# Patient Record
Sex: FEMALE | Race: WHITE | NOT HISPANIC OR LATINO | Employment: STUDENT | URBAN - METROPOLITAN AREA
[De-identification: names, ages, dates, MRNs, and addresses within clinical notes are randomized per-mention and may not be internally consistent; named-entity substitution may affect disease eponyms.]

---

## 2021-06-21 ENCOUNTER — TELEPHONE (OUTPATIENT)
Dept: OTOLARYNGOLOGY | Facility: CLINIC | Age: 21
End: 2021-06-21

## 2021-07-08 ENCOUNTER — OFFICE VISIT (OUTPATIENT)
Dept: OTOLARYNGOLOGY | Facility: CLINIC | Age: 21
End: 2021-07-08
Payer: COMMERCIAL

## 2021-07-08 VITALS
WEIGHT: 121.69 LBS | BODY MASS INDEX: 20.78 KG/M2 | DIASTOLIC BLOOD PRESSURE: 87 MMHG | HEIGHT: 64 IN | SYSTOLIC BLOOD PRESSURE: 126 MMHG

## 2021-07-08 DIAGNOSIS — R07.0 THROAT DISCOMFORT: Primary | ICD-10-CM

## 2021-07-08 DIAGNOSIS — J30.9 ALLERGIC RHINITIS, UNSPECIFIED SEASONALITY, UNSPECIFIED TRIGGER: ICD-10-CM

## 2021-07-08 DIAGNOSIS — Z79.01 ANTICOAGULANT LONG-TERM USE: ICD-10-CM

## 2021-07-08 DIAGNOSIS — R04.0 RIGHT-SIDED EPISTAXIS: ICD-10-CM

## 2021-07-08 PROCEDURE — 1126F PR PAIN SEVERITY QUANTIFIED, NO PAIN PRESENT: ICD-10-PCS | Mod: S$GLB,,, | Performed by: NURSE PRACTITIONER

## 2021-07-08 PROCEDURE — 99203 PR OFFICE/OUTPT VISIT, NEW, LEVL III, 30-44 MIN: ICD-10-PCS | Mod: 25,S$GLB,, | Performed by: NURSE PRACTITIONER

## 2021-07-08 PROCEDURE — 3008F BODY MASS INDEX DOCD: CPT | Mod: CPTII,S$GLB,, | Performed by: NURSE PRACTITIONER

## 2021-07-08 PROCEDURE — 30901 CONTROL OF NOSEBLEED: CPT | Mod: RT,S$GLB,, | Performed by: NURSE PRACTITIONER

## 2021-07-08 PROCEDURE — 1126F AMNT PAIN NOTED NONE PRSNT: CPT | Mod: S$GLB,,, | Performed by: NURSE PRACTITIONER

## 2021-07-08 PROCEDURE — 3008F PR BODY MASS INDEX (BMI) DOCUMENTED: ICD-10-PCS | Mod: CPTII,S$GLB,, | Performed by: NURSE PRACTITIONER

## 2021-07-08 PROCEDURE — 99203 OFFICE O/P NEW LOW 30 MIN: CPT | Mod: 25,S$GLB,, | Performed by: NURSE PRACTITIONER

## 2021-07-08 PROCEDURE — 30901 PR CTRL 2SEBLEED,ANTER,SIMPLE: ICD-10-PCS | Mod: RT,S$GLB,, | Performed by: NURSE PRACTITIONER

## 2021-07-08 PROCEDURE — 99999 PR PBB SHADOW E&M-EST. PATIENT-LVL III: CPT | Mod: PBBFAC,,, | Performed by: NURSE PRACTITIONER

## 2021-07-08 PROCEDURE — 99999 PR PBB SHADOW E&M-EST. PATIENT-LVL III: ICD-10-PCS | Mod: PBBFAC,,, | Performed by: NURSE PRACTITIONER

## 2021-07-08 RX ORDER — SUMATRIPTAN 50 MG/1
TABLET, FILM COATED ORAL
COMMUNITY
Start: 2021-06-18

## 2024-05-19 ENCOUNTER — HOSPITAL ENCOUNTER (EMERGENCY)
Facility: HOSPITAL | Age: 24
Discharge: HOME | End: 2024-05-19
Attending: EMERGENCY MEDICINE
Payer: COMMERCIAL

## 2024-05-19 VITALS
TEMPERATURE: 97 F | HEIGHT: 64 IN | RESPIRATION RATE: 21 BRPM | BODY MASS INDEX: 21.34 KG/M2 | OXYGEN SATURATION: 100 % | DIASTOLIC BLOOD PRESSURE: 90 MMHG | WEIGHT: 125 LBS | HEART RATE: 69 BPM | SYSTOLIC BLOOD PRESSURE: 119 MMHG

## 2024-05-19 DIAGNOSIS — G08 CEREBRAL VENOUS THROMBOSIS OF SIGMOID SINUS: ICD-10-CM

## 2024-05-19 DIAGNOSIS — B09 VIRAL EXANTHEM: ICD-10-CM

## 2024-05-19 DIAGNOSIS — T78.40XA ALLERGIC REACTION, INITIAL ENCOUNTER: Primary | ICD-10-CM

## 2024-05-19 LAB
ALBUMIN SERPL BCP-MCNC: 4.6 G/DL (ref 3.5–5.2)
ALP SERPL-CCNC: 46 U/L (ref 55–135)
ALT SERPL W/O P-5'-P-CCNC: 13 U/L (ref 10–44)
ANION GAP SERPL CALC-SCNC: 8 MMOL/L (ref 8–16)
AST SERPL-CCNC: 19 U/L (ref 10–40)
B-HCG UR QL: NEGATIVE
BASOPHILS # BLD AUTO: 0.13 K/UL (ref 0–0.2)
BASOPHILS NFR BLD: 2.7 % (ref 0–1.9)
BILIRUB SERPL-MCNC: 0.4 MG/DL (ref 0.1–1)
BUN SERPL-MCNC: 10 MG/DL (ref 6–20)
CALCIUM SERPL-MCNC: 9.3 MG/DL (ref 8.7–10.5)
CHLORIDE SERPL-SCNC: 106 MMOL/L (ref 95–110)
CO2 SERPL-SCNC: 22 MMOL/L (ref 23–29)
CREAT SERPL-MCNC: 0.7 MG/DL (ref 0.5–1.4)
CREAT SERPL-MCNC: 0.7 MG/DL (ref 0.5–1.4)
CTP QC/QA: YES
DIFFERENTIAL METHOD BLD: ABNORMAL
EOSINOPHIL # BLD AUTO: 0.2 K/UL (ref 0–0.5)
EOSINOPHIL NFR BLD: 4.8 % (ref 0–8)
ERYTHROCYTE [DISTWIDTH] IN BLOOD BY AUTOMATED COUNT: 18.6 % (ref 11.5–14.5)
EST. GFR  (NO RACE VARIABLE): >60 ML/MIN/1.73 M^2
GLUCOSE SERPL-MCNC: 82 MG/DL (ref 70–110)
GLUCOSE SERPL-MCNC: 82 MG/DL (ref 70–110)
HCT VFR BLD AUTO: 32.8 % (ref 37–48.5)
HGB BLD-MCNC: 10.1 G/DL (ref 12–16)
IMM GRANULOCYTES # BLD AUTO: 0 K/UL (ref 0–0.04)
IMM GRANULOCYTES NFR BLD AUTO: 0 % (ref 0–0.5)
INR PPP: 0.9 (ref 0.8–1.2)
LYMPHOCYTES # BLD AUTO: 1.9 K/UL (ref 1–4.8)
LYMPHOCYTES NFR BLD: 39.3 % (ref 18–48)
MCH RBC QN AUTO: 23.2 PG (ref 27–31)
MCHC RBC AUTO-ENTMCNC: 30.8 G/DL (ref 32–36)
MCV RBC AUTO: 75 FL (ref 82–98)
MONOCYTES # BLD AUTO: 0.5 K/UL (ref 0.3–1)
MONOCYTES NFR BLD: 11.2 % (ref 4–15)
NEUTROPHILS # BLD AUTO: 2 K/UL (ref 1.8–7.7)
NEUTROPHILS NFR BLD: 42 % (ref 38–73)
NRBC BLD-RTO: 0 /100 WBC
OHS QRS DURATION: 94 MS
OHS QTC CALCULATION: 434 MS
PLATELET # BLD AUTO: 648 K/UL (ref 150–450)
PMV BLD AUTO: 8.9 FL (ref 9.2–12.9)
POTASSIUM SERPL-SCNC: 3.9 MMOL/L (ref 3.5–5.1)
PROT SERPL-MCNC: 8 G/DL (ref 6–8.4)
PROTHROMBIN TIME: 10.4 SEC (ref 9–12.5)
RBC # BLD AUTO: 4.35 M/UL (ref 4–5.4)
SAMPLE: NORMAL
SODIUM SERPL-SCNC: 136 MMOL/L (ref 136–145)
TSH SERPL DL<=0.005 MIU/L-ACNC: 1.63 UIU/ML (ref 0.34–5.6)
WBC # BLD AUTO: 4.81 K/UL (ref 3.9–12.7)

## 2024-05-19 PROCEDURE — 99285 EMERGENCY DEPT VISIT HI MDM: CPT | Mod: 25

## 2024-05-19 PROCEDURE — 85610 PROTHROMBIN TIME: CPT | Performed by: EMERGENCY MEDICINE

## 2024-05-19 PROCEDURE — 93010 ELECTROCARDIOGRAM REPORT: CPT | Mod: ,,, | Performed by: INTERNAL MEDICINE

## 2024-05-19 PROCEDURE — 80053 COMPREHEN METABOLIC PANEL: CPT | Performed by: EMERGENCY MEDICINE

## 2024-05-19 PROCEDURE — 85025 COMPLETE CBC W/AUTO DIFF WBC: CPT | Performed by: EMERGENCY MEDICINE

## 2024-05-19 PROCEDURE — 81025 URINE PREGNANCY TEST: CPT | Performed by: EMERGENCY MEDICINE

## 2024-05-19 PROCEDURE — 82565 ASSAY OF CREATININE: CPT

## 2024-05-19 PROCEDURE — 84443 ASSAY THYROID STIM HORMONE: CPT | Performed by: EMERGENCY MEDICINE

## 2024-05-19 PROCEDURE — 93005 ELECTROCARDIOGRAM TRACING: CPT | Performed by: INTERNAL MEDICINE

## 2024-05-19 RX ORDER — PREDNISONE 20 MG/1
20 TABLET ORAL DAILY
Qty: 5 TABLET | Refills: 0 | Status: SHIPPED | OUTPATIENT
Start: 2024-05-19 | End: 2024-05-21

## 2024-05-19 RX ORDER — VALACYCLOVIR HYDROCHLORIDE 1 G/1
1000 TABLET, FILM COATED ORAL EVERY 12 HOURS
Qty: 14 TABLET | Refills: 0 | Status: SHIPPED | OUTPATIENT
Start: 2024-05-19 | End: 2024-05-19

## 2024-05-19 RX ORDER — VALACYCLOVIR HYDROCHLORIDE 1 G/1
1000 TABLET, FILM COATED ORAL EVERY 12 HOURS
Qty: 14 TABLET | Refills: 0 | Status: SHIPPED | OUTPATIENT
Start: 2024-05-19 | End: 2024-05-21

## 2024-05-19 RX ORDER — PREDNISONE 20 MG/1
20 TABLET ORAL DAILY
Qty: 5 TABLET | Refills: 0 | Status: SHIPPED | OUTPATIENT
Start: 2024-05-19 | End: 2024-05-19

## 2024-05-19 NOTE — ED PROVIDER NOTES
Encounter Date: 5/19/2024       History     Chief Complaint   Patient presents with    Facial Swelling     C/o rt facial swelling. Onset this am. Denies trauma.     24-year-old well-appearing female presents emergency department with a history of chronic left sphenoid sinus thrombosis presents emergency department with complaint of right-sided facial swelling.  Patient states yesterday she had a headache she took Motrin prior to going to bed she states she woke up this morning and noticed an area of swelling to the right side of her frontal forehead, under her right eye, and half of her right lower lip.  Patient denies any current headache, numbness or tingling to her face or extremities, weakness.  She has no obvious focal neuro deficits.  Patient's mother is with her and reports that she was taken off of Coumadin prior to graduating from high school    The history is provided by the patient and a parent.     Review of patient's allergies indicates:  No Known Allergies  No past medical history on file.  No past surgical history on file.  No family history on file.  Social History     Tobacco Use    Smoking status: Never   Substance Use Topics    Alcohol use: No     Alcohol/week: 0.0 standard drinks of alcohol    Drug use: No     Review of Systems   Constitutional: Negative.    HENT:  Positive for facial swelling. Negative for hearing loss, rhinorrhea, sinus pain and trouble swallowing.    Respiratory: Negative.     Cardiovascular: Negative.    Gastrointestinal: Negative.    Genitourinary: Negative.    Skin: Negative.    Neurological:  Negative for dizziness, tremors, seizures, syncope, speech difficulty, weakness, light-headedness and headaches.   Hematological: Negative.    Psychiatric/Behavioral: Negative.     All other systems reviewed and are negative.      Physical Exam     Initial Vitals [05/19/24 0849]   BP Pulse Resp Temp SpO2   126/87 78 15 97.8 °F (36.6 °C) 99 %      MAP       --         Physical  Exam    Nursing note and vitals reviewed.  Constitutional: She appears well-developed and well-nourished.   HENT:   Head: Head is without right periorbital erythema.       Right Ear: External ear normal.   Left Ear: External ear normal.   Nose: Nose normal.   Mouth/Throat: Uvula is midline, oropharynx is clear and moist and mucous membranes are normal. No oropharyngeal exudate or tonsillar abscesses.   Eyes: Conjunctivae and EOM are normal. Pupils are equal, round, and reactive to light.   Neck: Neck supple.   Normal range of motion.  Cardiovascular:  Normal rate, regular rhythm, normal heart sounds and intact distal pulses.           Pulmonary/Chest: Breath sounds normal.   Abdominal: Abdomen is soft. Bowel sounds are normal.   Musculoskeletal:      Cervical back: Normal range of motion and neck supple.     Neurological: She is alert and oriented to person, place, and time. She has normal strength. GCS score is 15. GCS eye subscore is 4. GCS verbal subscore is 5. GCS motor subscore is 6.   Skin: Skin is warm.   Psychiatric: She has a normal mood and affect.         ED Course   Procedures  Labs Reviewed   CBC W/ AUTO DIFFERENTIAL - Abnormal; Notable for the following components:       Result Value    Hemoglobin 10.1 (*)     Hematocrit 32.8 (*)     MCV 75 (*)     MCH 23.2 (*)     MCHC 30.8 (*)     RDW 18.6 (*)     Platelets 648 (*)     MPV 8.9 (*)     Basophil % 2.7 (*)     All other components within normal limits   COMPREHENSIVE METABOLIC PANEL - Abnormal; Notable for the following components:    CO2 22 (*)     Alkaline Phosphatase 46 (*)     All other components within normal limits   PROTIME-INR   TSH   POCT URINE PREGNANCY   ISTAT CREATININE   POCT GLUCOSE   POCT GLUCOSE MONITORING CONTINUOUS        ECG Results              ECG 12 lead (Final result)        Collection Time Result Time QRS Duration OHS QTC Calculation    05/19/24 09:59:47 05/19/24 10:30:55 94 434                     Final result by Interface, Lab  In [facility] (05/19/24 10:30:59)                   Narrative:    Test Reason : R29.818,    Vent. Rate : 082 BPM     Atrial Rate : 082 BPM     P-R Int : 150 ms          QRS Dur : 094 ms      QT Int : 372 ms       P-R-T Axes : 070 081 075 degrees     QTc Int : 434 ms    Normal sinus rhythm  Normal ECG  No previous ECGs available  Confirmed by José Miguel Isabel MD ([id number]) on 5/19/2024 10:30:51 AM    Referred By: [email or web address]   SELF           Confirmed By:José Miguel Isabel MD                                  Imaging Results              MRV Brain Without Contrast (Final result)  Result time 05/19/24 12:01:48      Final result by Jimmy Howe MD (05/19/24 12:01:48)                   Impression:      Findings compatible with the history of a chronic left transverse and sigmoid sinus thrombus.      Electronically signed by: Jimmy Howe  Date:    05/19/2024  Time:    12:01               Narrative:    CLINICAL HISTORY:  ([id number])25 y/o  (2000) F    Dural venous sinus thrombosis suspected;    TECHNIQUE:  (A#[id number], exam time 5/19/2024 11:52)    MRV BRAIN WITHOUT CONTRAST [id number]    MR venography of the brain was performed with three dimensional maximal intensity projection reconstructed images were created on an independent workstation and reviewed, as well as source images.    COMPARISON:  Outside report from 06/20/2016    FINDINGS:  Review of source images and maximum intensity projections demonstrates the superior sagittal sinus, straight sinus, right transverse sinuses, right sigmoid sinuses to have a normal flow pattern.  The left transverse sinus is not opacified, with minimal flow in the sigmoid sinus (likely related to chronic thrombosis), with small collaterals supplying the left jugular vein, likely through tiny occipital and collateral branches.  The jugular bulbs, vein of Keith, and internal cerebral veins were identified and appeared otherwise unremarkable.                                       MRI  Brain Without Contrast (Final result)  Result time 05/19/24 11:58:39      Final result by Jimmy Howe MD (05/19/24 11:58:39)                   Impression:      No acute intracranial process. Normal appearing MRI of the Brain.      Electronically signed by: Jimmy Howe  Date:    05/19/2024  Time:    11:58               Narrative:    CLINICAL HISTORY:  ([id number])23 y/o  (2000) F    Headache, chronic, no new features;Acute focal neurological deficit;    TECHNIQUE:  ([id number], exam time 5/19/2024 11:52)    MRI BRAIN WITHOUT CONTRAST [id number]    Multiplanar multisequence MRI of the brain was performed.    CONTRAST:    No contrast was administered.    COMPARISON:  None available.    FINDINGS:  No hydrocephalus, herniation or midline shift and the basal/suprasellar cisterns are patent.  Diffusion imaging does not indicate acute or recent stroke. No bleed is seen on the gradient reversal images.    The brain has normal ventricles with symmetrical sulci, unremarkable internal structure, and normal signal pattern throughout. There is no cerebral or cerebellar atrophy.    The pituitary gland and infundibulum is normal in size without abnormal signal pattern. The craniocervical junction is unremarkable. The temporal bones, internal and external meati, and semicircular canals appear normal. The orbital contents are normal.  The visualized paranasal sinuses and mastoid air cells are clear.                                       Medications - No data to display  Medical Decision Making  24-year-old well-appearing female presents emergency department with a history of chronic left sphenoid sinus thrombosis presents emergency department with complaint of right-sided facial swelling.  Patient states yesterday she had a headache she took Motrin prior to going to bed she states she woke up this morning and noticed an area of swelling to the right side of her frontal forehead, under her right eye, and half of her  right lower lip.  Patient denies any current headache, numbness or tingling to her face or extremities, weakness.  She has no obvious focal neuro deficits.  Patient's mother is with her and reports that she was taken off of Coumadin prior to graduating from high school    The history is provided by the patient and a parent.     Considerations include but not limited to, chronic left sigmoid sinus thrombosis, CVA, electrolyte abnormalities, acute allergic reaction, HSV, zoster    24-year-old female presents emergency department has a history of chronic left sphenoid sinus thrombosis that was diagnosed when she was a teenager she was previously on Coumadin however she was taken off of Coumadin several years ago patient has been fine she states that last night she had a headache she woke up this morning and noted facial swelling to the frontal aspect of the forehead, under her right eye, and half of her right lower bottom lip was swollen with a vesicle.  Patient denies any recent traumas, itching, visual disturbances, fevers focal weakness.  Neuro exam is grossly intact with no upper or lower extremity weakness she is able to ambulate without drift, negative Romberg, no negative finger-nose.  Further evaluation performed with labs which were unremarkable patient's H&H is stable compared to previous labs noted in the system that were drawn at [facility] she also has elevated platelets again this is been stable for the patient and persistent since previous labs further evaluation performed with MRI of the brain which revealed no acute stroke or abnormalities.  MRV performed which revealedFindings compatible with the history of a chronic left transverse and sigmoid sinus thrombus.  On re-evaluation of the patient the swelling to the right frontal aspect has completely resolved and swelling underneath the right eye and lip is significantly better without any type of medications.After discussion with my attending MD we decided  to treat for possible viral and or allergic reaction.  Patient will be discharged home with oral steroids for the next 5 days, antiviral medications she was instructed to take oral any over-the-counter an appointment was made for establishment with a PCP who she was instructed to follow up with to get a referral to heme Onc.  Patient and her mother were given return precautions    Amount and/or Complexity of Data Reviewed  Independent Historian: parent  External Data Reviewed: labs, radiology and notes.     Details: Reviewed outside labs and MRI/ MRA   Labs: ordered. Decision-making details documented in ED Course.  Radiology: ordered.    Risk  Prescription drug management.                                      Clinical Impression:  Final diagnoses:  [T78.40XA] Allergic reaction, initial encounter (Primary)  [G08] Cerebral venous thrombosis of sigmoid sinus  [B09] Viral exanthem          ED Disposition Condition    Discharge Stable          ED Prescriptions       Medication Sig Dispense Start Date End Date Auth. Provider    predniSONE (DELTASONE) 20 MG tablet  (Status: Discontinued) Take 1 tablet (20 mg total) by mouth once daily. for 5 days 5 tablet 5/19/2024 5/19/2024 Reina Peres FNP    valACYclovir (VALTREX) 1000 MG tablet  (Status: Discontinued) Take 1 tablet (1,000 mg total) by mouth every 12 (twelve) hours. for 7 days 14 tablet 5/19/2024 5/19/2024 Reina Peres FNP    predniSONE (DELTASONE) 20 MG tablet Take 1 tablet (20 mg total) by mouth once daily. for 5 days 5 tablet 5/19/2024 5/24/2024 Jacob Munoz MD    valACYclovir (VALTREX) 1000 MG tablet Take 1 tablet (1,000 mg total) by mouth every 12 (twelve) hours. for 7 days 14 tablet 5/19/2024 5/26/2024 Jacob Munoz MD          Follow-up Information       Follow up With Specialties Details Why Contact Info    Jan Menezes MD Family Medicine  as scheduled [address]  [address]  [place] [address]  [phone]               Ja  Reina, [facility]  05/19/24 1641

## 2024-05-19 NOTE — DISCHARGE INSTRUCTIONS
Take over-the-counter antihistamines i.e. Benadryl or Zyrtec  Take oral medications as directed until all gone  Please follow-up with primary care provider for further evaluation, definitive care, and referral to heme Onc  Return if condition becomes worse for any concerns

## 2024-05-19 NOTE — Clinical Note
"Malia"Chyna Michelle was seen and treated in our emergency department on 5/19/2024.  She may return to work on 05/21/2024.       If you have any questions or concerns, please don't hesitate to call.      Reina Peres, RASHEED"

## 2024-05-19 NOTE — ED NOTES
"R FACIAL SWELLING EXTENDING UP TO FOREHEAD.  DENIES SOB OR EAR PAIN. PRIVATE ROOM. EVEN AND NON LABORED RESPIRATIONS.  AIRWAY CLEAR.  PULSES REGULAR.  < 3" CAPILLARY REFILL. SKIN WDI.  MAEW.  NON DISTENDED ABDOMEN. ALERT, ORIENTED AND AMBULATORY. NAD AT THIS TIME.  PARENT AT BS. CALL LIGHT IN REACH.  "

## 2024-05-21 ENCOUNTER — OFFICE VISIT (OUTPATIENT)
Dept: FAMILY MEDICINE | Facility: CLINIC | Age: 24
End: 2024-05-21
Payer: COMMERCIAL

## 2024-05-21 VITALS
SYSTOLIC BLOOD PRESSURE: 110 MMHG | DIASTOLIC BLOOD PRESSURE: 72 MMHG | OXYGEN SATURATION: 98 % | HEART RATE: 83 BPM | TEMPERATURE: 99 F | BODY MASS INDEX: 20.14 KG/M2 | RESPIRATION RATE: 18 BRPM | HEIGHT: 64 IN | WEIGHT: 118 LBS

## 2024-05-21 DIAGNOSIS — G08 CEREBRAL VENOUS THROMBOSIS: ICD-10-CM

## 2024-05-21 DIAGNOSIS — G56.03 BILATERAL CARPAL TUNNEL SYNDROME: ICD-10-CM

## 2024-05-21 DIAGNOSIS — Z23 IMMUNIZATION DUE: ICD-10-CM

## 2024-05-21 DIAGNOSIS — Z00.00 PREVENTATIVE HEALTH CARE: ICD-10-CM

## 2024-05-21 DIAGNOSIS — G44.1 VASCULAR HEADACHE: Primary | ICD-10-CM

## 2024-05-21 PROCEDURE — 1159F MED LIST DOCD IN RCRD: CPT | Mod: CPTII,S$GLB,, | Performed by: FAMILY MEDICINE

## 2024-05-21 PROCEDURE — 3074F SYST BP LT 130 MM HG: CPT | Mod: CPTII,S$GLB,, | Performed by: FAMILY MEDICINE

## 2024-05-21 PROCEDURE — 3078F DIAST BP <80 MM HG: CPT | Mod: CPTII,S$GLB,, | Performed by: FAMILY MEDICINE

## 2024-05-21 PROCEDURE — 99204 OFFICE O/P NEW MOD 45 MIN: CPT | Mod: 25,S$GLB,, | Performed by: FAMILY MEDICINE

## 2024-05-21 PROCEDURE — 3008F BODY MASS INDEX DOCD: CPT | Mod: CPTII,S$GLB,, | Performed by: FAMILY MEDICINE

## 2024-05-21 PROCEDURE — 99999 PR PBB SHADOW E&M-EST. PATIENT-LVL IV: CPT | Mod: PBBFAC,,, | Performed by: FAMILY MEDICINE

## 2024-05-21 PROCEDURE — 90715 TDAP VACCINE 7 YRS/> IM: CPT | Mod: S$GLB,,, | Performed by: FAMILY MEDICINE

## 2024-05-21 PROCEDURE — 90471 IMMUNIZATION ADMIN: CPT | Mod: S$GLB,,, | Performed by: FAMILY MEDICINE

## 2024-05-21 RX ORDER — NAPROXEN SODIUM 220 MG/1
81 TABLET, FILM COATED ORAL DAILY
Qty: 30 TABLET | Refills: 11 | Status: SHIPPED | OUTPATIENT
Start: 2024-05-21 | End: 2025-05-21

## 2024-05-21 RX ORDER — SUMATRIPTAN 50 MG/1
50 TABLET, FILM COATED ORAL ONCE
Qty: 16 TABLET | Refills: 5 | Status: SHIPPED | OUTPATIENT
Start: 2024-05-21 | End: 2024-05-21

## 2024-05-21 RX ORDER — ONDANSETRON 4 MG/1
8 TABLET, ORALLY DISINTEGRATING ORAL EVERY 12 HOURS
Qty: 16 TABLET | Refills: 5 | Status: SHIPPED | OUTPATIENT
Start: 2024-05-21 | End: 2024-11-17

## 2024-05-21 NOTE — PROGRESS NOTES
Subjective:       Patient ID: Malia Michelle is a 24 y.o. female.    Chief Complaint: Establish Care      Is here to get established she also needs a neuro neurology referral because of a history of venous thrombosis/arterial thrombosis, has been seen for migraines in the past        Allergies and Medications:   Review of patient's allergies indicates:  No Known Allergies  Current Outpatient Medications   Medication Sig Dispense Refill    aspirin 81 MG Chew Take 1 tablet (81 mg total) by mouth once daily. 30 tablet 11    ondansetron (ZOFRAN-ODT) 4 MG TbDL Take 2 tablets (8 mg total) by mouth every 12 (twelve) hours. 16 tablet 5    sumatriptan (IMITREX) 50 MG tablet Take 1 tablet (50 mg total) by mouth once. for 1 dose 16 tablet 5     No current facility-administered medications for this visit.       Family History:   No family history on file.    Social History:   Social History     Socioeconomic History    Marital status: Single   Tobacco Use    Smoking status: Never   Substance and Sexual Activity    Alcohol use: No     Alcohol/week: 0.0 standard drinks of alcohol    Drug use: No       Review of Systems    Objective:     Vitals:    05/21/24 0825   BP: 110/72   Pulse: 83   Resp: 18   Temp: 98.7 °F (37.1 °C)        Physical Exam  Vitals and nursing note reviewed.   Constitutional:       General: She is not in acute distress.     Appearance: Normal appearance. She is well-developed and normal weight. She is not ill-appearing, toxic-appearing or diaphoretic.   HENT:      Head: Normocephalic and atraumatic.   Eyes:      Pupils: Pupils are equal, round, and reactive to light.   Cardiovascular:      Rate and Rhythm: Normal rate and regular rhythm.      Heart sounds: Normal heart sounds. No murmur heard.     No friction rub. No gallop.   Pulmonary:      Effort: Pulmonary effort is normal. No respiratory distress.      Breath sounds: Normal breath sounds. No stridor. No wheezing, rhonchi or rales.   Chest:      Chest wall:  No tenderness.   Musculoskeletal:      Right lower leg: No edema.      Left lower leg: No edema.   Neurological:      Mental Status: She is alert.      Comments: Patient has objective sensory intact to all fingertips but positive Tinel's test both hands   Psychiatric:         Behavior: Behavior normal.         Thought Content: Thought content normal.         Judgment: Judgment normal.         Assessment:       1. Vascular headache    2. Preventative health care    3. Immunization due    4. Bilateral carpal tunnel syndrome -symptoms are mild and positional and is recommended patient's splint the arm as a trial.   5. Cerebral venous thrombosis        Plan:       Malia was seen today for establish care.    Diagnoses and all orders for this visit:    Vascular headache  -     ondansetron (ZOFRAN-ODT) 4 MG TbDL; Take 2 tablets (8 mg total) by mouth every 12 (twelve) hours.  -     sumatriptan (IMITREX) 50 MG tablet; Take 1 tablet (50 mg total) by mouth once. for 1 dose  -     Ambulatory referral/consult to Neurology; Future  -     Ambulatory referral/consult to Obstetrics / Gynecology; Future    Preventative health care  -     HEPATITIS C ANTIBODY; Future  -     Cancel: LIPID PANEL; Future  -     HIV 1/2 Ag/Ab (4th Gen); Future  -     Comprehensive Metabolic Panel; Future  -     Lipid Panel; Future  -     Ambulatory referral/consult to Neurology; Future    Immunization due  -     Tdap Vaccine    Bilateral carpal tunnel syndrome  -     Ambulatory referral/consult to Neurology; Future    Cerebral venous thrombosis  -     aspirin 81 MG Chew; Take 1 tablet (81 mg total) by mouth once daily.         No follow-ups on file.

## 2024-05-28 ENCOUNTER — TELEPHONE (OUTPATIENT)
Dept: NEUROLOGY | Facility: CLINIC | Age: 24
End: 2024-05-28
Payer: COMMERCIAL

## 2024-05-30 ENCOUNTER — LAB VISIT (OUTPATIENT)
Dept: LAB | Facility: HOSPITAL | Age: 24
End: 2024-05-30
Attending: FAMILY MEDICINE
Payer: COMMERCIAL

## 2024-05-30 DIAGNOSIS — Z00.00 PREVENTATIVE HEALTH CARE: ICD-10-CM

## 2024-05-30 LAB
ALBUMIN SERPL BCP-MCNC: 3.7 G/DL (ref 3.5–5.2)
ALP SERPL-CCNC: 49 U/L (ref 55–135)
ALT SERPL W/O P-5'-P-CCNC: 14 U/L (ref 10–44)
ANION GAP SERPL CALC-SCNC: 8 MMOL/L (ref 8–16)
AST SERPL-CCNC: 18 U/L (ref 10–40)
BILIRUB SERPL-MCNC: 0.3 MG/DL (ref 0.1–1)
BUN SERPL-MCNC: 7 MG/DL (ref 6–20)
CALCIUM SERPL-MCNC: 9.2 MG/DL (ref 8.7–10.5)
CHLORIDE SERPL-SCNC: 105 MMOL/L (ref 95–110)
CHOLEST SERPL-MCNC: 194 MG/DL (ref 120–199)
CHOLEST/HDLC SERPL: 3.5 {RATIO} (ref 2–5)
CO2 SERPL-SCNC: 23 MMOL/L (ref 23–29)
CREAT SERPL-MCNC: 0.8 MG/DL (ref 0.5–1.4)
EST. GFR  (NO RACE VARIABLE): >60 ML/MIN/1.73 M^2
GLUCOSE SERPL-MCNC: 83 MG/DL (ref 70–110)
HCV AB SERPL QL IA: NORMAL
HDLC SERPL-MCNC: 56 MG/DL (ref 40–75)
HDLC SERPL: 28.9 % (ref 20–50)
HIV 1+2 AB+HIV1 P24 AG SERPL QL IA: NORMAL
LDLC SERPL CALC-MCNC: 127.2 MG/DL (ref 63–159)
NONHDLC SERPL-MCNC: 138 MG/DL
POTASSIUM SERPL-SCNC: 3.9 MMOL/L (ref 3.5–5.1)
PROT SERPL-MCNC: 7.3 G/DL (ref 6–8.4)
SODIUM SERPL-SCNC: 136 MMOL/L (ref 136–145)
TRIGL SERPL-MCNC: 54 MG/DL (ref 30–150)

## 2024-05-30 PROCEDURE — 80061 LIPID PANEL: CPT | Performed by: FAMILY MEDICINE

## 2024-05-30 PROCEDURE — 36415 COLL VENOUS BLD VENIPUNCTURE: CPT | Performed by: FAMILY MEDICINE

## 2024-05-30 PROCEDURE — 86803 HEPATITIS C AB TEST: CPT | Performed by: FAMILY MEDICINE

## 2024-05-30 PROCEDURE — 87389 HIV-1 AG W/HIV-1&-2 AB AG IA: CPT | Performed by: FAMILY MEDICINE

## 2024-05-30 PROCEDURE — 80053 COMPREHEN METABOLIC PANEL: CPT | Performed by: FAMILY MEDICINE

## 2024-05-31 ENCOUNTER — TELEPHONE (OUTPATIENT)
Dept: FAMILY MEDICINE | Facility: CLINIC | Age: 24
End: 2024-05-31
Payer: COMMERCIAL

## 2024-05-31 NOTE — TELEPHONE ENCOUNTER
----- Message from Jan Menezes MD sent at 5/31/2024  8:08 AM CDT -----  Results Ok, notify patient.

## 2024-08-26 PROBLEM — Z00.00 PREVENTATIVE HEALTH CARE: Status: RESOLVED | Noted: 2024-05-21 | Resolved: 2024-08-26

## 2024-11-21 ENCOUNTER — TELEPHONE (OUTPATIENT)
Dept: FAMILY MEDICINE | Facility: CLINIC | Age: 24
End: 2024-11-21
Payer: COMMERCIAL

## 2024-11-21 ENCOUNTER — LAB VISIT (OUTPATIENT)
Dept: LAB | Facility: HOSPITAL | Age: 24
End: 2024-11-21
Attending: FAMILY MEDICINE
Payer: COMMERCIAL

## 2024-11-21 ENCOUNTER — OFFICE VISIT (OUTPATIENT)
Dept: FAMILY MEDICINE | Facility: CLINIC | Age: 24
End: 2024-11-21
Payer: COMMERCIAL

## 2024-11-21 VITALS
TEMPERATURE: 97 F | HEART RATE: 72 BPM | BODY MASS INDEX: 20.32 KG/M2 | OXYGEN SATURATION: 98 % | WEIGHT: 119 LBS | RESPIRATION RATE: 18 BRPM | DIASTOLIC BLOOD PRESSURE: 78 MMHG | SYSTOLIC BLOOD PRESSURE: 100 MMHG | HEIGHT: 64 IN

## 2024-11-21 DIAGNOSIS — G44.1 VASCULAR HEADACHE: ICD-10-CM

## 2024-11-21 DIAGNOSIS — G43.109 MIGRAINE WITH AURA AND WITHOUT STATUS MIGRAINOSUS, NOT INTRACTABLE: Primary | ICD-10-CM

## 2024-11-21 DIAGNOSIS — G08 CEREBRAL VENOUS THROMBOSIS: ICD-10-CM

## 2024-11-21 DIAGNOSIS — Z23 IMMUNIZATION DUE: ICD-10-CM

## 2024-11-21 DIAGNOSIS — R20.8 DYSESTHESIA: ICD-10-CM

## 2024-11-21 DIAGNOSIS — G08 CEREBRAL VENOUS THROMBOSIS: Primary | ICD-10-CM

## 2024-11-21 LAB
ALBUMIN SERPL BCP-MCNC: 4.3 G/DL (ref 3.5–5.2)
ALP SERPL-CCNC: 54 U/L (ref 40–150)
ALT SERPL W/O P-5'-P-CCNC: 16 U/L (ref 10–44)
ANION GAP SERPL CALC-SCNC: 11 MMOL/L (ref 8–16)
AST SERPL-CCNC: 20 U/L (ref 10–40)
BASOPHILS # BLD AUTO: 0.21 K/UL (ref 0–0.2)
BASOPHILS NFR BLD: 3.5 % (ref 0–1.9)
BILIRUB SERPL-MCNC: 0.3 MG/DL (ref 0.1–1)
BUN SERPL-MCNC: 6 MG/DL (ref 6–20)
CALCIUM SERPL-MCNC: 9.6 MG/DL (ref 8.7–10.5)
CHLORIDE SERPL-SCNC: 106 MMOL/L (ref 95–110)
CO2 SERPL-SCNC: 22 MMOL/L (ref 23–29)
CREAT SERPL-MCNC: 0.7 MG/DL (ref 0.5–1.4)
DIFFERENTIAL METHOD BLD: ABNORMAL
EOSINOPHIL # BLD AUTO: 0.3 K/UL (ref 0–0.5)
EOSINOPHIL NFR BLD: 5.6 % (ref 0–8)
ERYTHROCYTE [DISTWIDTH] IN BLOOD BY AUTOMATED COUNT: 18.6 % (ref 11.5–14.5)
EST. GFR  (NO RACE VARIABLE): >60 ML/MIN/1.73 M^2
GLUCOSE SERPL-MCNC: 71 MG/DL (ref 70–110)
HCT VFR BLD AUTO: 30.7 % (ref 37–48.5)
HGB BLD-MCNC: 9.3 G/DL (ref 12–16)
IMM GRANULOCYTES # BLD AUTO: 0.01 K/UL (ref 0–0.04)
IMM GRANULOCYTES NFR BLD AUTO: 0.2 % (ref 0–0.5)
LYMPHOCYTES # BLD AUTO: 2.3 K/UL (ref 1–4.8)
LYMPHOCYTES NFR BLD: 38.1 % (ref 18–48)
MCH RBC QN AUTO: 23 PG (ref 27–31)
MCHC RBC AUTO-ENTMCNC: 30.3 G/DL (ref 32–36)
MCV RBC AUTO: 76 FL (ref 82–98)
MONOCYTES # BLD AUTO: 0.6 K/UL (ref 0.3–1)
MONOCYTES NFR BLD: 10.5 % (ref 4–15)
NEUTROPHILS # BLD AUTO: 2.6 K/UL (ref 1.8–7.7)
NEUTROPHILS NFR BLD: 42.1 % (ref 38–73)
NRBC BLD-RTO: 0 /100 WBC
PLATELET # BLD AUTO: 703 K/UL (ref 150–450)
PMV BLD AUTO: 9.3 FL (ref 9.2–12.9)
POTASSIUM SERPL-SCNC: 4 MMOL/L (ref 3.5–5.1)
PROT SERPL-MCNC: 8.5 G/DL (ref 6–8.4)
RBC # BLD AUTO: 4.04 M/UL (ref 4–5.4)
SODIUM SERPL-SCNC: 139 MMOL/L (ref 136–145)
WBC # BLD AUTO: 6.07 K/UL (ref 3.9–12.7)

## 2024-11-21 PROCEDURE — 3078F DIAST BP <80 MM HG: CPT | Mod: CPTII,S$GLB,, | Performed by: FAMILY MEDICINE

## 2024-11-21 PROCEDURE — 81240 F2 GENE: CPT | Performed by: FAMILY MEDICINE

## 2024-11-21 PROCEDURE — 85025 COMPLETE CBC W/AUTO DIFF WBC: CPT | Performed by: FAMILY MEDICINE

## 2024-11-21 PROCEDURE — 1159F MED LIST DOCD IN RCRD: CPT | Mod: CPTII,S$GLB,, | Performed by: FAMILY MEDICINE

## 2024-11-21 PROCEDURE — 85303 CLOT INHIBIT PROT C ACTIVITY: CPT | Performed by: FAMILY MEDICINE

## 2024-11-21 PROCEDURE — 85220 BLOOC CLOT FACTOR V TEST: CPT | Performed by: FAMILY MEDICINE

## 2024-11-21 PROCEDURE — 81241 F5 GENE: CPT | Performed by: FAMILY MEDICINE

## 2024-11-21 PROCEDURE — 99999 PR PBB SHADOW E&M-EST. PATIENT-LVL IV: CPT | Mod: PBBFAC,,, | Performed by: FAMILY MEDICINE

## 2024-11-21 PROCEDURE — 85300 ANTITHROMBIN III ACTIVITY: CPT | Performed by: FAMILY MEDICINE

## 2024-11-21 PROCEDURE — 99214 OFFICE O/P EST MOD 30 MIN: CPT | Mod: 25,S$GLB,, | Performed by: FAMILY MEDICINE

## 2024-11-21 PROCEDURE — 90471 IMMUNIZATION ADMIN: CPT | Mod: S$GLB,,, | Performed by: FAMILY MEDICINE

## 2024-11-21 PROCEDURE — 80053 COMPREHEN METABOLIC PANEL: CPT | Performed by: FAMILY MEDICINE

## 2024-11-21 PROCEDURE — 36415 COLL VENOUS BLD VENIPUNCTURE: CPT | Performed by: FAMILY MEDICINE

## 2024-11-21 PROCEDURE — 90656 IIV3 VACC NO PRSV 0.5 ML IM: CPT | Mod: S$GLB,,, | Performed by: FAMILY MEDICINE

## 2024-11-21 PROCEDURE — 3074F SYST BP LT 130 MM HG: CPT | Mod: CPTII,S$GLB,, | Performed by: FAMILY MEDICINE

## 2024-11-21 PROCEDURE — 3008F BODY MASS INDEX DOCD: CPT | Mod: CPTII,S$GLB,, | Performed by: FAMILY MEDICINE

## 2024-11-21 PROCEDURE — 85306 CLOT INHIBIT PROT S FREE: CPT | Performed by: FAMILY MEDICINE

## 2024-11-21 RX ORDER — RIMEGEPANT SULFATE 75 MG/75MG
75 TABLET, ORALLY DISINTEGRATING ORAL EVERY OTHER DAY
Qty: 15 TABLET | Refills: 24 | Status: SHIPPED | OUTPATIENT
Start: 2024-11-21 | End: 2026-11-20

## 2024-11-21 RX ORDER — SUMATRIPTAN 50 MG/1
50 TABLET, FILM COATED ORAL ONCE
Qty: 16 TABLET | Refills: 5 | Status: SHIPPED | OUTPATIENT
Start: 2024-11-21 | End: 2024-11-21

## 2024-11-21 RX ORDER — PREDNISONE 20 MG/1
40 TABLET ORAL
COMMUNITY
Start: 2024-09-27 | End: 2024-11-22

## 2024-11-21 NOTE — PROGRESS NOTES
Subjective:       Patient ID: Malia Michelle is a 24 y.o. female.    Chief Complaint: Migraine      History of Present Illness               Allergies and Medications:   Review of patient's allergies indicates:  No Known Allergies  Current Outpatient Medications   Medication Sig Dispense Refill    aspirin 81 MG Chew Take 1 tablet (81 mg total) by mouth once daily. 30 tablet 11    predniSONE (DELTASONE) 20 MG tablet Take 40 mg by mouth. (Patient not taking: Reported on 11/21/2024)      rimegepant (NURTEC) 75 mg odt Take 1 tablet (75 mg total) by mouth every other day. Place ODT tablet on the tongue; alternatively the ODT tablet may be placed under the tongue for 365 doses 15 tablet 24    sumatriptan (IMITREX) 50 MG tablet Take 1 tablet (50 mg total) by mouth once. for 1 dose 16 tablet 5     Current Facility-Administered Medications   Medication Dose Route Frequency Provider Last Rate Last Admin    influenza (Flulaval, Fluzone, Fluarix) 45 mcg/0.5 mL IM vaccine (> or = 6 mo) 0.5 mL  0.5 mL Intramuscular 1 time in Clinic/HOD            Family History:   No family history on file.    Social History:   Social History     Socioeconomic History    Marital status: Single   Tobacco Use    Smoking status: Never   Substance and Sexual Activity    Alcohol use: No     Alcohol/week: 0.0 standard drinks of alcohol    Drug use: No     Lab Results   Component Value Date    WBC 4.81 05/19/2024    HGB 10.1 (L) 05/19/2024    HCT 32.8 (L) 05/19/2024     (H) 05/19/2024    CHOL 194 05/30/2024    TRIG 54 05/30/2024    HDL 56 05/30/2024    ALT 14 05/30/2024    AST 18 05/30/2024     05/30/2024    K 3.9 05/30/2024     05/30/2024    CREATININE 0.8 05/30/2024    BUN 7 05/30/2024    CO2 23 05/30/2024    TSH 1.626 05/19/2024    INR 0.9 05/19/2024             Objective:     Vitals:    11/21/24 0902   BP: 100/78   Pulse: 72   Resp: 18   Temp: 97.4 °F (36.3 °C)        Physical Exam                 Assessment:       1. Migraine  with aura and without status migrainosus, not intractable    2. Vascular headache    3. Cerebral venous thrombosis    4. Immunization due    5. Dysesthesia        Plan:       Assessment & Plan             Malia was seen today for migraine.    Diagnoses and all orders for this visit:    Migraine with aura and without status migrainosus, not intractable  -     Ambulatory referral/consult to Obstetrics / Gynecology; Future  -     rimegepant (NURTEC) 75 mg odt; Take 1 tablet (75 mg total) by mouth every other day. Place ODT tablet on the tongue; alternatively the ODT tablet may be placed under the tongue for 365 doses    Vascular headache  -     sumatriptan (IMITREX) 50 MG tablet; Take 1 tablet (50 mg total) by mouth once. for 1 dose    Cerebral venous thrombosis  -     rimegepant (NURTEC) 75 mg odt; Take 1 tablet (75 mg total) by mouth every other day. Place ODT tablet on the tongue; alternatively the ODT tablet may be placed under the tongue for 365 doses  -     CBC Auto Differential; Future  -     Comprehensive Metabolic Panel; Future  -     Antithrombin III; Future  -     Factor 5 leiden; Future  -     Protein C activity; Future  -     Protein S antigen, free; Future  -     Protein S antigen, total - [facility] Only; Future  -     Prothrombin gene mutation; Future  -     FACTOR 5 ASSAY; Future    Immunization due  -     influenza (Flulaval, Fluzone, Fluarix) 45 mcg/0.5 mL IM vaccine (> or = 6 mo) 0.5 mL    Dysesthesia  -     X-Ray Chest PA And Lateral; Future         Follow up in about 6 months (around 5/21/2025), or if symptoms worsen or fail to improve.  This note was generated with the assistance of ambient listening technology. Verbal consent was obtained by the patient and accompanying visitor(s) for the recording of patient appointment to facilitate this note. I attest to having reviewed and edited the generated note for accuracy, though some syntax or spelling errors may persist. Please contact the author of this  note for any clarification.

## 2024-11-21 NOTE — TELEPHONE ENCOUNTER
----- Message from Stephanie sent at 11/21/2024  9:47 AM CST -----  [facility] is calling for clarification on her sumatriptan   [phone]

## 2024-11-22 ENCOUNTER — TELEPHONE (OUTPATIENT)
Dept: FAMILY MEDICINE | Facility: CLINIC | Age: 24
End: 2024-11-22
Payer: COMMERCIAL

## 2024-11-22 DIAGNOSIS — D50.9 IRON DEFICIENCY ANEMIA, UNSPECIFIED IRON DEFICIENCY ANEMIA TYPE: Primary | ICD-10-CM

## 2024-11-22 RX ORDER — FERROUS GLUCONATE 324(38)MG
324 TABLET ORAL 2 TIMES DAILY
Qty: 60 TABLET | Refills: 5 | Status: SHIPPED | OUTPATIENT
Start: 2024-11-22 | End: 2025-05-21

## 2024-11-22 NOTE — TELEPHONE ENCOUNTER
----- Message from Jan Menezes MD sent at 11/22/2024  8:02 AM CST -----  Significant anemia with parameters indicating iron deficiency.  I will order some a bit additional blood test to check for specific causes and place him on iron replacement recheck your blood count in one-month.

## 2024-11-22 NOTE — PROGRESS NOTES
Significant anemia with parameters indicating iron deficiency.  I will order some a bit additional blood test to check for specific causes and place him on iron replacement recheck your blood count in one-month.

## 2024-11-23 LAB
AT III ACT/NOR PPP CHRO: 144 % (ref 75–135)
FACT V ACT/NOR PPP: 141 % (ref 70–150)
PROT C ACT/NOR PPP: 119 % (ref 73–180)
PROT S AG ACT/NOR PPP IA: 85 % (ref 60–150)
PROT S FREE AG ACT/NOR PPP IA: 90 % (ref 61–136)

## 2024-11-25 LAB
F2 C.20210G>A GENO BLD/T: NEGATIVE
F5 P.R506Q BLD/T QL: NEGATIVE

## 2025-01-02 ENCOUNTER — PATIENT MESSAGE (OUTPATIENT)
Dept: ADMINISTRATIVE | Facility: HOSPITAL | Age: 25
End: 2025-01-02
Payer: COMMERCIAL

## 2025-04-03 ENCOUNTER — PATIENT MESSAGE (OUTPATIENT)
Dept: ADMINISTRATIVE | Facility: HOSPITAL | Age: 25
End: 2025-04-03
Payer: COMMERCIAL

## 2025-04-21 DIAGNOSIS — G44.1 VASCULAR HEADACHE: ICD-10-CM

## 2025-04-21 RX ORDER — ONDANSETRON 4 MG/1
TABLET, ORALLY DISINTEGRATING ORAL
Qty: 16 TABLET | Refills: 5 | Status: SHIPPED | OUTPATIENT
Start: 2025-04-21

## 2025-04-21 NOTE — TELEPHONE ENCOUNTER
No care due was identified.  [facility] Embedded Care Due Messages. Reference number: [id number].   4/21/2025 9:31:34 AM CDT

## 2025-04-30 DIAGNOSIS — G44.1 VASCULAR HEADACHE: ICD-10-CM

## 2025-04-30 RX ORDER — SUMATRIPTAN SUCCINATE 50 MG/1
TABLET ORAL
Qty: 27 TABLET | Refills: 1 | Status: SHIPPED | OUTPATIENT
Start: 2025-04-30

## 2025-04-30 NOTE — TELEPHONE ENCOUNTER
No care due was identified.  [facility] Embedded Care Due Messages. Reference number: [id number].   4/30/2025 8:03:04 AM CDT

## 2025-04-30 NOTE — TELEPHONE ENCOUNTER
Refill Decision Note   Malia Michelle  is requesting a refill authorization.  Brief Assessment and Rationale for Refill:  Defer     Medication Therapy Plan: [facility]      Pharmacist review requested: Yes   Comments:     Note composed:11:16 AM 04/30/2025

## 2025-04-30 NOTE — TELEPHONE ENCOUNTER
Refill Routing Note   Medication(s) are not appropriate for processing by [facility] for the following reason(s):        Drug-drug interaction    [facility] action(s):  Defer      Medication Therapy Plan: [facility]    Pharmacist review requested: Yes     Appointments  past 12m or future 3m with PCP    Date Provider   Last Visit   11/21/2024 Jan Menezes MD   Next Visit   5/21/2025 Jan Menezes MD   ED visits in past 90 days: 0        Note composed:8:24 AM 04/30/2025

## 2025-05-21 ENCOUNTER — OFFICE VISIT (OUTPATIENT)
Dept: FAMILY MEDICINE | Facility: CLINIC | Age: 25
End: 2025-05-21
Payer: COMMERCIAL

## 2025-05-21 VITALS
TEMPERATURE: 98 F | WEIGHT: 120.13 LBS | HEART RATE: 83 BPM | SYSTOLIC BLOOD PRESSURE: 116 MMHG | DIASTOLIC BLOOD PRESSURE: 84 MMHG | HEIGHT: 64 IN | RESPIRATION RATE: 17 BRPM | OXYGEN SATURATION: 96 % | BODY MASS INDEX: 20.51 KG/M2

## 2025-05-21 DIAGNOSIS — G43.109 MIGRAINE WITH AURA AND WITHOUT STATUS MIGRAINOSUS, NOT INTRACTABLE: ICD-10-CM

## 2025-05-21 DIAGNOSIS — Z00.00 PREVENTATIVE HEALTH CARE: Primary | ICD-10-CM

## 2025-05-21 DIAGNOSIS — G08 CEREBRAL VENOUS THROMBOSIS: ICD-10-CM

## 2025-05-21 DIAGNOSIS — G44.1 VASCULAR HEADACHE: ICD-10-CM

## 2025-05-21 PROCEDURE — 3079F DIAST BP 80-89 MM HG: CPT | Mod: CPTII,S$GLB,, | Performed by: FAMILY MEDICINE

## 2025-05-21 PROCEDURE — 99213 OFFICE O/P EST LOW 20 MIN: CPT | Mod: S$GLB,,, | Performed by: FAMILY MEDICINE

## 2025-05-21 PROCEDURE — 1159F MED LIST DOCD IN RCRD: CPT | Mod: CPTII,S$GLB,, | Performed by: FAMILY MEDICINE

## 2025-05-21 PROCEDURE — 3008F BODY MASS INDEX DOCD: CPT | Mod: CPTII,S$GLB,, | Performed by: FAMILY MEDICINE

## 2025-05-21 PROCEDURE — 99999 PR PBB SHADOW E&M-EST. PATIENT-LVL III: CPT | Mod: PBBFAC,,, | Performed by: FAMILY MEDICINE

## 2025-05-21 PROCEDURE — 3074F SYST BP LT 130 MM HG: CPT | Mod: CPTII,S$GLB,, | Performed by: FAMILY MEDICINE

## 2025-05-21 RX ORDER — ONDANSETRON 4 MG/1
4 TABLET, ORALLY DISINTEGRATING ORAL EVERY 8 HOURS PRN
Qty: 16 TABLET | Refills: 5 | Status: SHIPPED | OUTPATIENT
Start: 2025-05-21

## 2025-05-21 RX ORDER — RIMEGEPANT SULFATE 75 MG/75MG
75 TABLET, ORALLY DISINTEGRATING ORAL EVERY OTHER DAY
Qty: 15 TABLET | Refills: 24 | Status: SHIPPED | OUTPATIENT
Start: 2025-05-21 | End: 2027-05-20

## 2025-05-21 NOTE — PROGRESS NOTES
Subjective:       Patient ID: Malia Michelle is a 25 y.o. female.    Chief Complaint: Follow-up (6 month migraine management)      History of Present Illness    CHIEF COMPLAINT:  Ms. Michelle presents for a six-month follow-up on migraine management.    HPI:  Ms. Michelle reports more frequent migraines recently, attributed to increased stress from job searching. Migraines are characterized by flashes of light as an early symptom before headache onset, with pain localized across the front of her head. The frequency has increased compared to 3 months ago. She typically treats migraines with ibuprofen first, then Imitrex (sumatriptan) if needed. The previously prescribed Nurtec was not filled by the pharmacy. She has nausea with her migraines and a history of venous thrombosis.    Ms. Micehlle also reports allergy-related headaches, distinct from migraines, occurring with allergy attacks and frequent sneezing. She has symptoms of carpal tunnel syndrome, including tingling and stiffness in her hands, particularly in the morning. These symptoms affect all fingers, including the thumb, and are described as partially numb. She uses a splint at night for this condition.    Ms. Michelle denies having migraines on only one side of her head and currently owning a cat.    MEDICATIONS:  Ms. Michelle is on Sumatriptan (Imitrex) as needed for migraines, with Ibuprofen taken first before Sumatriptan. She is also taking an iron supplement, 1 tablet twice daily for anemia, which causes nausea. Ms. iMchelle has discontinued Emgality.    MEDICAL HISTORY:  Ms. Michelle has a history of migraine headaches, venous thrombosis, anemia, carpal tunnel syndrome, and cat allergy. Ms. Michelle has not had a Pap smear.    TEST RESULTS:  Ms. Michelle underwent an anemia test in November.    ALLERGIES:  Ms. Michelle is allergic to cats, experiencing symptoms of sneezing and nasal congestion.    SOCIAL HISTORY:  Occupation: Looking for a job      ROS:  Head: +headaches  Eyes: +spots,  specks or flashing lights  ENT: +nasal congestion  Gastrointestinal: +nausea  Musculoskeletal: +joint stiffness  Neurological: +numbness, +migraines, +tingling  Allergic: +allergic reactions          Allergies and Medications:   Review of patient's allergies indicates:  No Known Allergies  Current Medications[1]    Family History:   No family history on file.    Social History:   Social History[2]        Objective:     Vitals:    05/21/25 0932   BP: 116/84   Pulse: 83   Resp: 17   Temp: 98.2 °F (36.8 °C)        Physical Exam    General: No acute distress. Well-developed. Well-nourished.  Eyes: EOMI. Sclerae anicteric. Pupils equal round reactive to light and accommodation.  HENT: Normocephalic. Atraumatic. Nares patent. Moist oral mucosa.  Cardiovascular: Regular rate. Regular rhythm. No murmurs. No rubs. No gallops. Normal S1, S2.  Respiratory: Normal respiratory effort. Clear to auscultation bilaterally. No rales. No rhonchi. No wheezing.  Musculoskeletal: No  obvious deformity.  Extremities: No lower extremity edema.  Neurological: Alert & oriented x3. No slurred speech. Normal gait.  Psychiatric: Normal mood. Normal affect. Good insight. Good judgment.  Skin: Warm. Dry. No rash.            Assessment:       1. Preventative health care    2. Migraine with aura and without status migrainosus, not intractable    3. Cerebral venous thrombosis    4. Vascular headache        Plan:       Assessment & Plan    G43.101 Migraine with aura, not intractable, with status migrainosus    MIGRAINE WITH AURA:  - Monitored patient's migraine frequency and severity, noting an increase likely due to stress from job searching.  - Ms. Michelle experiences flashes of light and early symptoms before headache onset, with frontal headaches occurring more frequently than three months ago.  - Confirmed diagnosis of migraines rather than sinus headaches, with good blood pressure and clear lungs bilaterally.  - Assessed various triggers including  food, bright lights, and physical/emotional stress.  - Determined Nurtec would be more effective than Emgality and Imitrex due to quicker onset of action.  - Prescribed Nurtec to be taken before ibuprofen if needed, and refilled Zofran for potential associated nausea.  - Instructed patient to fill prescriptions at [facility].    CARPAL TUNNEL SYNDROME:  - Evaluated symptoms and recommended conservative management with splinting before considering referral to orthopedics.  - Ms. Michelle to continue using splints at night.    ANEMIA:  - Reviewed iron supplementation, considering reduction or discontinuation based on pending lab results.  - Started women's multivitamin with iron, 1 tablet daily.  - Ordered labs to reassess anemia status.    NEUROPATHY:  - Informed about potential benefits of multivitamins, particularly folic acid and B12, for neuropathies.  - Described transient neuropathy (neuropraxia) and its typical presentation.    FOLLOW-UP:  - Follow up in 6 months for annual well visit.  - Contact the office if refills are needed before the next appointment.        Malia was seen today for follow-up.    Diagnoses and all orders for this visit:    Preventative health care  -     multivitamin-iron-folic acid Tab; Take 1 tablet by mouth Daily.    Migraine with aura and without status migrainosus, not intractable  -     rimegepant (NURTEC) 75 mg odt; Take 1 tablet (75 mg total) by mouth every other day. Place ODT tablet on the tongue; alternatively the ODT tablet may be placed under the tongue for 365 doses    Cerebral venous thrombosis  -     rimegepant (NURTEC) 75 mg odt; Take 1 tablet (75 mg total) by mouth every other day. Place ODT tablet on the tongue; alternatively the ODT tablet may be placed under the tongue for 365 doses    Vascular headache  -     ondansetron (ZOFRAN-ODT) 4 MG TbDL; Take 1 tablet (4 mg total) by mouth every 8 (eight) hours as needed (nausea).         Follow up in about 6 months  (around 11/21/2025) for annual.  This note was generated with the assistance of ambient listening technology. Verbal consent was obtained by the patient and accompanying visitor(s) for the recording of patient appointment to facilitate this note. I attest to having reviewed and edited the generated note for accuracy, though some syntax or spelling errors may persist. Please contact the author of this note for any clarification.   Subjective:       Patient ID: Malia Michelle is a 25 y.o. female.    Chief Complaint: Follow-up (6 month migraine management)    Lab Results   Component Value Date    WBC 6.07 11/21/2024    HGB 9.3 (L) 11/21/2024    HCT 30.7 (L) 11/21/2024     (H) 11/21/2024    CHOL 194 05/30/2024    TRIG 54 05/30/2024    HDL 56 05/30/2024    ALT 16 11/21/2024    AST 20 11/21/2024     11/21/2024    K 4.0 11/21/2024     11/21/2024    CREATININE 0.7 11/21/2024    BUN 6 11/21/2024    CO2 22 (L) 11/21/2024    TSH 1.626 05/19/2024    INR 0.9 05/19/2024         History of Present Illness               Allergies and Medications:   Review of patient's allergies indicates:  No Known Allergies  Current Medications[3]    Family History:   No family history on file.    Social History:   Social History[4]        Objective:     Vitals:    05/21/25 0932   BP: 116/84   Pulse: 83   Resp: 17   Temp: 98.2 °F (36.8 °C)        Physical Exam                 Assessment:       1. Preventative health care    2. Migraine with aura and without status migrainosus, not intractable    3. Cerebral venous thrombosis    4. Vascular headache        Plan:       Assessment & Plan             Malia was seen today for follow-up.    Diagnoses and all orders for this visit:    Preventative health care  -     multivitamin-iron-folic acid Tab; Take 1 tablet by mouth Daily.    Migraine with aura and without status migrainosus, not intractable  -     rimegepant (NURTEC) 75 mg odt; Take 1 tablet (75 mg total) by mouth every other  day. Place ODT tablet on the tongue; alternatively the ODT tablet may be placed under the tongue for 365 doses    Cerebral venous thrombosis  -     rimegepant (NURTEC) 75 mg odt; Take 1 tablet (75 mg total) by mouth every other day. Place ODT tablet on the tongue; alternatively the ODT tablet may be placed under the tongue for 365 doses    Vascular headache  -     ondansetron (ZOFRAN-ODT) 4 MG TbDL; Take 1 tablet (4 mg total) by mouth every 8 (eight) hours as needed (nausea).         Follow up in about 6 months (around 11/21/2025) for annual.  This note was generated with the assistance of ambient listening technology. Verbal consent was obtained by the patient and accompanying visitor(s) for the recording of patient appointment to facilitate this note. I attest to having reviewed and edited the generated note for accuracy, though some syntax or spelling errors may persist. Please contact the author of this note for any clarification.            [1]   Current Outpatient Medications   Medication Sig Dispense Refill    aspirin 81 MG Chew Take 1 tablet (81 mg total) by mouth once daily. 30 tablet 11    ferrous gluconate (FERGON) 324 MG tablet Take 1 tablet (324 mg total) by mouth 2 (two) times a day. 60 tablet 5    multivitamin-iron-folic acid Tab Take 1 tablet by mouth Daily. 30 tablet 11    ondansetron (ZOFRAN-ODT) 4 MG TbDL Take 1 tablet (4 mg total) by mouth every 8 (eight) hours as needed (nausea). 16 tablet 5    rimegepant (NURTEC) 75 mg odt Take 1 tablet (75 mg total) by mouth every other day. Place ODT tablet on the tongue; alternatively the ODT tablet may be placed under the tongue for 365 doses 15 tablet 24    sumatriptan (IMITREX) 50 MG tablet TAKE ONE TABLET BY MOUTH ONCE FOR one DOSE (Patient not taking: Reported on 5/21/2025) 27 tablet 1     No current facility-administered medications for this visit.   [2]   Social History  Socioeconomic History    Marital status: Single   Tobacco Use    Smoking status:  Never   Substance and Sexual Activity    Alcohol use: No     Alcohol/week: 0.0 standard drinks of alcohol    Drug use: No   [3]   Current Outpatient Medications   Medication Sig Dispense Refill    aspirin 81 MG Chew Take 1 tablet (81 mg total) by mouth once daily. 30 tablet 11    ferrous gluconate (FERGON) 324 MG tablet Take 1 tablet (324 mg total) by mouth 2 (two) times a day. 60 tablet 5    multivitamin-iron-folic acid Tab Take 1 tablet by mouth Daily. 30 tablet 11    ondansetron (ZOFRAN-ODT) 4 MG TbDL Take 1 tablet (4 mg total) by mouth every 8 (eight) hours as needed (nausea). 16 tablet 5    rimegepant (NURTEC) 75 mg odt Take 1 tablet (75 mg total) by mouth every other day. Place ODT tablet on the tongue; alternatively the ODT tablet may be placed under the tongue for 365 doses 15 tablet 24    sumatriptan (IMITREX) 50 MG tablet TAKE ONE TABLET BY MOUTH ONCE FOR one DOSE (Patient not taking: Reported on 5/21/2025) 27 tablet 1     No current facility-administered medications for this visit.   [4]   Social History  Socioeconomic History    Marital status: Single   Tobacco Use    Smoking status: Never   Substance and Sexual Activity    Alcohol use: No     Alcohol/week: 0.0 standard drinks of alcohol    Drug use: No

## 2025-08-05 DIAGNOSIS — G44.1 VASCULAR HEADACHE: ICD-10-CM

## 2025-08-05 NOTE — TELEPHONE ENCOUNTER
No care due was identified.  [facility] Embedded Care Due Messages. Reference number: [id number].   8/05/2025 5:05:06 PM CDT

## 2025-08-06 RX ORDER — SUMATRIPTAN SUCCINATE 50 MG/1
50 TABLET ORAL DAILY
Qty: 30 TABLET | Refills: 1 | Status: SHIPPED | OUTPATIENT
Start: 2025-08-06